# Patient Record
Sex: MALE | Race: WHITE | NOT HISPANIC OR LATINO | Employment: STUDENT | URBAN - METROPOLITAN AREA
[De-identification: names, ages, dates, MRNs, and addresses within clinical notes are randomized per-mention and may not be internally consistent; named-entity substitution may affect disease eponyms.]

---

## 2019-06-25 ENCOUNTER — ALLIED HEALTH/NURSE VISIT (OUTPATIENT)
Dept: TRANSPLANT | Facility: CLINIC | Age: 29
End: 2019-06-25
Attending: INTERNAL MEDICINE
Payer: COMMERCIAL

## 2019-06-25 ENCOUNTER — RESULTS ONLY (OUTPATIENT)
Dept: LAB | Age: 29
End: 2019-06-25

## 2019-06-25 ENCOUNTER — RESULTS ONLY (OUTPATIENT)
Dept: ONCOLOGY | Facility: CLINIC | Age: 29
End: 2019-06-25

## 2019-06-25 ENCOUNTER — RESULTS ONLY (OUTPATIENT)
Dept: OTHER | Facility: CLINIC | Age: 29
End: 2019-06-25

## 2019-06-25 ENCOUNTER — APPOINTMENT (OUTPATIENT)
Dept: LAB | Facility: CLINIC | Age: 29
End: 2019-06-25
Attending: INTERNAL MEDICINE
Payer: COMMERCIAL

## 2019-06-25 DIAGNOSIS — Z00.6 RESEARCH STUDY PATIENT: Primary | ICD-10-CM

## 2019-06-25 LAB — CMV IGG SERPL QL IA: >8 AI (ref 0–0.8)

## 2019-06-25 PROCEDURE — 36415 COLL VENOUS BLD VENIPUNCTURE: CPT

## 2019-06-25 NOTE — PROGRESS NOTES
[id number]: Informed Consent Note     The consent form, including purpose, risks and benefits, was reviewed with Damian Nava, and all questions were answered before he signed the consent form. The patient understands that the study involves a pre-screening phase, a screening phase, and a donation phase. They understand that re-treatment may be possible.    Present during the discussion were Damian and myself. A copy of the signed form was provided to the patient. No procedures specific to this study were performed prior to the patient signing the consent form.    Consent Version Date: 11/6/18  Consent obtained by: Mirian Mckinney    Date: June 25, 2019  HIPAA authorization signed?: yes  HIPAA authorization version date: 8/25/17    MIRIAN MCKINNEY    Form 503.03.01 (Version 2)     Effective date: 01AUG2018     Next Review Date: 01AUG2020

## 2019-06-25 NOTE — NURSING NOTE
Chief Complaint   Patient presents with     Blood Draw     Labs drawn via VPT by MA. Research kit done.     Krista Slater MA

## 2019-06-28 LAB
A* LOCUS: NORMAL
A*: NORMAL
ABTEST METHOD: NORMAL
B* LOCUS: NORMAL
B*: NORMAL
BW-1: NORMAL
BW-2: NORMAL
C* LOCUS: NORMAL
C*: NORMAL

## 2022-03-28 ENCOUNTER — HOSPITAL ENCOUNTER (OUTPATIENT)
Dept: BEHAVIORAL HEALTH | Facility: CLINIC | Age: 32
Discharge: HOME | End: 2022-03-28
Attending: FAMILY MEDICINE | Admitting: FAMILY MEDICINE
Payer: COMMERCIAL

## 2022-03-28 VITALS — BODY MASS INDEX: 30.48 KG/M2 | HEIGHT: 72 IN | WEIGHT: 225 LBS

## 2022-03-28 PROCEDURE — H0001 ALCOHOL AND/OR DRUG ASSESS: HCPCS | Mod: GT,95

## 2022-03-28 RX ORDER — DIPHENOXYLATE HYDROCHLORIDE AND ATROPINE SULFATE 2.5; .025 MG/1; MG/1
1 TABLET ORAL DAILY
COMMUNITY

## 2022-03-28 RX ORDER — CHLORAL HYDRATE 500 MG
CAPSULE ORAL
COMMUNITY
Start: 2022-01-01

## 2022-03-28 ASSESSMENT — COLUMBIA-SUICIDE SEVERITY RATING SCALE - C-SSRS
TOTAL  NUMBER OF ABORTED OR SELF INTERRUPTED ATTEMPTS LIFETIME: NO
ATTEMPT LIFETIME: NO
1. HAVE YOU WISHED YOU WERE DEAD OR WISHED YOU COULD GO TO SLEEP AND NOT WAKE UP?: NO
6. HAVE YOU EVER DONE ANYTHING, STARTED TO DO ANYTHING, OR PREPARED TO DO ANYTHING TO END YOUR LIFE?: NO
TOTAL  NUMBER OF INTERRUPTED ATTEMPTS LIFETIME: NO
2. HAVE YOU ACTUALLY HAD ANY THOUGHTS OF KILLING YOURSELF?: NO

## 2022-03-28 ASSESSMENT — PAIN SCALES - GENERAL: PAINLEVEL: NO PAIN (0)

## 2022-03-28 NOTE — PROGRESS NOTES
"[facility]  [address] [place] [id number]  3/28/2022    Damian Nava  [address] [place] [address]      [place],    This is to verify that Damian Nava (1990) participated in a substance use disorder assessment via telephone call with JUDY Fajardo/KENDAL at [facility] in [place], [place] on 3/28/2022.    Recommendations:  1.)  It was recommended that the patient refrain from drinking and driving at all times.  2.)  Follow all the terms and conditions of his pending court probation, included participating in alcohol and drug screening with court probation as directed.  3.)  Attend and complete a minimum of a Level I Driving with Care 12-hour DWI class.    4.)  In addition, if the patient chooses to continue to drink alcohol he should only drink alcohol in a minimal and social manner by not exceeding 3 \"standard\" alcoholic beverages (12 ounces of regular beer in the 5% alcohol range, 5 ounces of wine in the 12% alcohol range, or 1.5 ounces of distilled spirits in the 40% alcohol range) in any 24-hour period and by spreading his use of alcohol out over a long enough period of time given his gender and his body weight to avoid exceeding a 0.04 blood alcohol concentration.     Below is a list with programs which offer both the Level I and the Level II Driving with Care DWI classes.      The Level I Driving with Care class is a 12-hour DWI class.    The Level II Driving with Care class is a 24-hour DWI class.      You have been recommended to attend the Level I Driving with Care 12-hour DWI class.    Program name Telephone number Website   [employer]    [phone] [email or web address]     [facility] [phone] [email or web address]     [email or web address]   [phone] www.[email or web address]/   [employer] [facility]. [phone] [email or web address]        [facility] " Connection [phone] [email or web address]      [facility] [phone] [email or web address]          There are additional programs which offer these Level I and Level II Driving with Care classes, so if none of the above programs work for you to attend a class, you can use an Internet search to find additional programs which offer the Driving with Care classes.      It is your responsibility to set up a time to attend and complete the recommended level of the Driving with Care class and it is your responsibility to have the program you attend send a letter of completion of the Driving with Care class to your court , your court probation department and/or to your  as needed.     Rational for recommended level of care: The patient would benefit from increasing his awareness regarding the risks of substance abuse and from working on skills to minimize the potential for having future negative substance use consequences by attending a Level I Driving with Care 12-hour DWI class.    The patient reported he is willing to follow the above recommendations.    If you have any additional questions or concerns, please feel free to contact me by any of the contact methods listed below.    Sincerely,    JUDY Jacob, [facility]  CD Evaluation Counselor  [facility]  Rec[facility] Services  [address] [place] [id number]  [email or web address]  Tel: [phone]  Fax: [phone]

## 2022-03-28 NOTE — PROGRESS NOTES
[facility]  Provider Name:  JUDY Quijano/[facility]     Telephone: [phone]      PATIENT'S NAME: Damian Nava  PREFERRED NAME: Estiven  PRONOUNS: he/him/his    MRN: [id number]  : 1990   ACCT. NUMBER:  [id number]  DATE OF SERVICE: 2022  START TIME: 7:59 am  END TIME: 8:50 am  E-MAIL: [email or web address]   PREFERRED PHONE: [phone]   May we leave a program related message: Yes  ADDRESS:   [id number] [place] [id number]  SERVICE MODALITY:  Video Visit:        Provider verified identity through the following two step process.  Patient provided:  Patient  (1990) and Patient's last 4 digits of N ([phone])    Telemedicine Visit: The patient's condition can be safely assessed and treated via synchronous audio and visual telemedicine encounter.      Reason for Telemedicine Visit: Services only offered telehealth    Originating Site (Patient Location): Patient's home    Distant Site (Provider Location): Provider Remote Setting    Consent:  The patient/guardian has verbally consented to: the potential risks and benefits of telemedicine (video visit) versus in person care; bill my insurance or make self-payment for services provided; and responsibility for payment of non-covered services.     Patient would like the video invitation sent by:  My Chart    Mode of Communication:  Video Conference via [employer]well    EMERGENCY CONTACT:   Taty Hadleydaniele (wife)  Tel #: [phone]    Santiago Elijah (brother)  Tel #: [phone]    UNIVERSAL ADULT SUBSTANCE USE DISORDER DIAGNOSTIC ASSESSMENT    Identifying Information:  The patient is a 31 year old, /White male of Scandinavian and Maori decent.  The pronoun use throughout this assessment reflects the patient's chosen pronoun.  The patient was referred for an assessment by the Court system in [place] where he received a OWI.  The patient attended the session alone.     Chief  Complaint:   The patient had an assessment via a remote video visit at [facility] with this counselor for evaluation of a possible substance use disorder.  The reason for the substance use disorder assessment was due to the patient's  recommending he complete a substance use disorder assessment.  The precipitating event was the patient was arrested for an OWI charge in [place] on 3/19/2022.  The patient reported he had consumed 4 beers between 5:00 pm and 8:30 pm while at a casino in [place] and he was pulled over as he was driving 15-minutes away to the hotel where he and his wife were staying for the night.  The patient reported being pulled over because the headlights on his vehicle were not on as he was driving away from the casino.  The patient reported he was driving his wife's vehicle and he had expected the headlights to come on automatically as they do on his vehicle, but he had failed to confirm the lights were on prior to driving on that night.  The patient reported having 1 prior legal charge for a minor consumption at age 17 and he denied having any other history of legal charges or arrests.  The patient reported he is not yet on court probation for his OWI charge in [place] and his next court date is on 4/11/2022.  The patient appeared to be willing follow the recommendations to refrain from drinking and driving at all times and to attend and complete a minimum of a Level I Driving with Care 12-hour DWI class.  The patient denied ever having any concerns about having substance abuse issues.  The patient denied having any inpatient detoxification admissions or inpatient hospitalizations for withdrawal symptoms.  The patient denied having any history of participating in a substance use disorder treatment program.  The patient denied having any history of attending 12-step or other support group meetings.  The patient reported he had never felt any need to make significant changes to his use of  alcohol.  The patient does not appear to be in severe withdrawal, an imminent safety risk to self or others, or requiring immediate medical attention and may proceed with the assessment interview.    Social/Family History:  The patient reported growing up in [place], [place].  The patient reported being raised by both of his parents in the same family home.  The patient reported discipline in his family home was in the form of verbal reprimands or being spanked.  The patient denied experiencing or witnessing any verbal, physical or sexual abuse when he was growing up in the family home.  The patient reported feeling supported by all of his family members when he was growing up.  The patient reported being raised in the Religion Che[employer].  The patient reported a history of:   Family History   Problem Relation Age of Onset     Substance Abuse Father      Substance Abuse Brother    The patient reported overall his childhood was happy.  The patient described his current relationships with his family of origin as being good.      The patient describes his cultural background as being a /White male of Scandinavian and Beninese decent.  Cultural influences and impact on patient's life structure, values, norms, and healthcare: The patient denied cultural concerns had an impact on life structure, values, norms, or healthcare.  Contextual influences on patient's health include: Family Factors: The patient reported his father and 1 of his 2 brothers had a history of alcohol abuse, but both of them had been in recovery for several years.  The patient identified his preferred language to be English.  The patient reported he does not need the assistance of an  or other support involved in therapy.  The patient reported he is actively involved in community of maricel activities.  The patient felt his spirituality would likely play some role in his recovery.    The patient reported having no significant delays in  developmental tasks.  The patient's highest education level was graduate school.  The patient identified the following learning problems: none reported.  The patient reports he is able to understand written materials.    The patient reported being  1 time and he is still  to his wife.  The patient identified as being heterosexual and he reported being  to his wife for the past 7 years.  The patient's wife drinks alcohol in a very minimal and social manner.  The patient reported having 3 sons, his oldest is 5 years old and then he has 2 twin boys who are 9-months old who live with him and his wife in the family home.     The patient reported living his wife and their 3 sons in a single family home.  The patient denied having any concerns regarding his immediate living environment and/or neighborhood and he plans to continue to live there.  The patient identified his wife, his father, his mother, his in-laws and his siblings as being his primary support network at this time.  The patient identified the quality of his relationships with his support network as being good.  The patient would like the following people involved in treatment services if recommended: None at this time.     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  The patient reported engaging in the following recreation/leisure activities while using alcohol or other non-prescribed mood altering chemicals: Getting together with his wife's cousins at social events, would have 1-2 beers.  The patient reported the following people are supportive of his recovery: his wife, his father, his mother, his in-laws and his siblings.    The patient reported he had been working full-time as a  for the past 11+ years.  The patient reported his income is obtained through employment.  The patient reported having  financial stressors at this time, including money being more tight because his wife is not working at this time.      The patient reported the following substance related arrests or legal issues: The patient was arrested for an OWI charge in [place] on 3/19/2022.  The patient reported he had consumed 4 beers between 5:00 pm and 8:30 pm while at a casino in [place] and he was pulled over as he was driving 15-minutes away to the hotel where he and his wife were staying for the night.  The patient reported being pulled over because the headlights on his vehicle were not on as he was driving away from the casino.  The patient reported he was driving his wife's vehicle and he had expected the headlights to come on automatically as they do on his vehicle, but he had failed to confirm the lights were on prior to driving on that night.  The patient reported having 1 prior legal charge for a minor consumption at age 17 and he denied having any other history of legal charges or arrests.  The patient reported he is not yet on court probation for his OWI charge in [place] and his next court date is on 4/11/2022.       Patient's Strengths and Limitations:  The patient identified the following strengths or resources that will help him succeed in treatment: [employer] / [employer], commitment to health and well being, exercise routine, maricel / spirituality, family support, positive work environment, motivation and work ethic.  Things that may interfere with the patient's success in treatment include: none identified.     Assessments:  The following assessments were completed by patient for this visit:  PHQ2:   PHQ-2 ( 1999 [employer]) 3/28/2022   Q1: Little interest or pleasure in doing things 0   Q2: Feeling down, depressed or hopeless 1   PHQ-2 Score 1     GAD2:   RONNIE-2 3/25/2022   Feeling nervous, anxious, or on edge 1   Not being able to stop or control worrying 0   RONNIE-2 Total Score 1     [place] Suicide Severity Rating Scale  (Lifetime/Recent)  [place] Suicide Severity Rating (Lifetime/Recent) 3/28/2022   1. Wish to be Dead (Lifetime) 0   2. Non-Specific Active Suicidal Thoughts (Lifetime) 0   Actual Attempt (Lifetime) 0   Has subject engaged in non-suicidal self-injurious behavior? (Lifetime) 0   Interrupted Attempts (Lifetime) 0   Aborted or Self-Interrupted Attempt (Lifetime) 0   Preparatory Acts or Behavior (Lifetime) 0   Calculated C-SSRS Risk Score (Lifetime/Recent) No Risk Indicated     Personal and Family Medical History:  The patient did not report a family history of mental health concerns.       The patient reported the following previous mental health diagnoses: The patient denied having any history of being diagnosed with a clinical mental health disorder.   The patient reported his primary mental health symptoms include: depression and these do not impact his ability to function.  The patient has not received mental health services in the past: The patient denied having any history of being prescribed psychotropic medications.  The patient denied having any history of working with a 1:1 mental health therapist.  Psychiatric Hospitalizations: None.  The patient denies a history of civil commitment.  Current mental health services/providers include:  The patient denied having any history of being prescribed psychotropic medications.  The patient denied having any history of working with a 1:1 mental health therapist.    GAIN-SS Tool:      When was the last time that you had significant problems...  a. with feeling very trapped, lonely, sad, blue, depressed or hopeless about the future? Past Month  b. with sleep trouble, such as bad dreams, sleeping restlessly, or falling asleep during the day? Never  c. with feeling very anxious, nervous, tense, scared, panicked or like something bad was going to happen?  2 - 12 months ago  d. with becoming very distressed and upset when something reminded you of the past?  Never  e. with  thinking about ending your life or committing suicide?  Never  When was the last time that you did the following things two or more times?  a. Lied or conned to get things you wanted or to avoid having to do something?   Never  b. Had a hard time paying attention at school, work or home? Never  c. Had a hard time listening to instructions at school, work or home?  Never  d. Were a bully or threatened other people?  Never  e. Started physical fights with other people?  Never    The patient has had a physical exam to rule out medical causes for current symptoms.  Date of last physical exam was within the past year. The patient was encouraged to follow up with PCP if symptoms were to develop.  The patient does not have a Primary Care Provider and was encouraged to establish care with a PCP.  The patient reported having no medical concerns at this time.  The patient denied having any current issues with pain.  The patient is male and is not pregnant.  There are not significant appetite / nutritional concerns / weight changes.  The patient does not report having a history of an eating disorder.  The patient does not report a history of head injury / trauma / cognitive impairment.      The patient reported current medications as:   Outpatient Medications Marked as Taking for the 3/28/22 encounter (Hospital Encounter) with Jayy Saucedo LADC   Medication Sig     fish oil-omega-3 fatty acids 1000 MG capsule      Medication Adherence:  The patient reported taking OTC medications on a daily basis.    Patient Allergies:    None    Medical History:    The patient reported having no medical concerns at this time.    Substance Use:  The patient reported the following biological family members or relatives with chemical health issues: The patient reported his father and 1 of his 2 brothers had a history of alcohol abuse, but both of them had been in recovery for several years.  The patient denied having any inpatient  detoxification admissions or inpatient hospitalizations for withdrawal symptoms.  The patient denied having any history of participating in a substance use disorder treatment program.  The patient denied having any history of attending 12-step or other support group meetings.  The patient denied having any history of participating in gambling treatment.  The patient is not currently receiving any chemical dependency treatment.               X = Primary Drug Used   Age of First Use Most Recent Pattern of Use and Duration   Need enough information to show pattern (both frequency and amounts) and to show tolerance for each chemical that has a diagnosis   Date of last use and time, if needed   Withdrawal Potential? Requiring special care Method of use  (oral, smoked, snort, IV, etc)     X Alcohol     16 The patient reported his heaviest use of alcohol had been between the ages of 16 to 20, when he reported a pattern of drinking 6 beers 2 times per week.    During the past year, he reported a pattern of drinking 1-2 beers 3-4 times per month.    Within the past year, he reported exceeding the above amounts and drinking 4 beers on 2-3 occasions.   3/19/2022    (4 beers) None Oral      Marijuana/  Hashish   No use          Cocaine/Crack     No use          Meth/  Amphetamines   No use          Heroin     No use          Other Opiates/  Synthetics   No use          Inhalants     No use          Benzodiazepines     No use          Hallucinogens     No use          Barbiturates/  Sedatives/  Hypnotics No use          Over-the-Counter Drugs   No use          Other     No use          Nicotine     No use         The patient reported the following problems as a result of their substance use: DUI and legal issues.  The patient is not concerned about substance use.     The patient reports experiencing the following withdrawal symptoms within the past 12 months: none and the following within the past 30 days: none. (DSM-11)  The  patient denied having significant urges to use alcohol.  (DSM-4)  The patient reported he has not used more alcohol than intended and over a longer period of time than intended.  (DSM-1)  The patient reported he has not had unsuccessful attempts to cut down or control use of alcohol.  (DSM-2)  The patient reported his longest period of abstinence from alcohol had been since 3/19/2022.  The patient reported he has not needed to use more alcohol to achieve the same effect.  (DSM-10)  The patient does not report diminished effect with use of same amount of alcohol.  (DSM-10)     The patient does not report a great deal of time is spent in activities necessary to obtain, use, or recover from alcohol effects.  (DSM-3)  The patient does not report important social, occupational, or recreational activities are given up or reduced because of alcohol use.  (DSM-7)  The patient denied having a persistent or recurrent physical or psychological problem that is likely to have been caused or exacerbated by use.  (DSM-9)  The patient reported the following problem behaviors while under the influence of substances: None.  (DSM-6)  The patient reported recurrent use of alcohol in physically hazardous such as driving a motor vehicle while under the influence.  (DSM-8)  The patient reported his recovery goal is: The patient's plan and goal is to abstain from alcohol and from all other non-prescribed mood altering chemicals.     The patient does not have other addictive behaviors he is concerned about at this time.  The patient reported his substance use has not negatively impacted his ability to function in a school setting.  (DSM-5)  The patient reported his substance use has not negatively impacted his ability to function in a work setting.  (DSM-5)  The patient's demographics and history impact his recovery in the following ways: The patient reported his father and 1 of his 2 brothers had a history of alcohol abuse, but both of  them had been in recovery for several years.      Dimension Scale Ratings:    Dimension 1 -  Acute Intoxication/Withdrawal: 0 - No Problem    Dimension 2 - Biomedical: 0 - No Problem    Dimension 3 - Emotional/Behavioral/Cognitive Conditions: 0 - No Problem    Dimension 4 - Readiness to Change:  1 - Minor Problem    Dimension 5 - Relapse/Continued Use/ Continued Problem Potential: 1 - Minor Problem    Dimension 6 - Recovery Environment:  1 - Minor Problem    Significant Losses / Trauma / Abuse / Neglect Issues:   The patient did not serve in the .  There are indications or report of significant loss, trauma, abuse or neglect issues related to: The patient denied having any history of being verbally, emotionally, physically or sexually abused.  The patient denied having any history of trauma issues.  The patient denied having any history of suicide attempts and denied having any current suicide ideation.  The patient denied having any history of self-injurious behavior.    Concerns for possible neglect are not present.    Safety Assessment:   Patient denies current homicidal ideation and behaviors.  Patient denies current self-injurious ideation and behaviors.    Patient reported unsafe motor vehicle operation associated with substance use.  Patient denied any high risk behaviors associated with mental health symptoms.  Patient reports the following current concerns for their personal safety: None.  Patient reports there are not firearms in the house.     History of Safety Concerns:  Patient denied a history of homicidal ideation.     Patient denied a history of personal safety concerns.    Patient denied a history of assaultive behaviors.    Patient denied a history of sexual assault behaviors.     Patient reported a history of unsafe motor vehicle operation associated with substance use.  Patient denies any history of high risk behaviors associated with mental health symptoms.  Patient reports the following  protective factors: spirituality, positive relationships positive family connections, forward/future oriented thinking, dedication to family/friends, safe and stable environment, effectively controls impulses, regular physical activity, living with other people, daily obligations, structured day, commitment to well-being, healthy fear of risky behaviors or pain, financial stability, strong sense of self-worth/esteem and sense of personal control or determination.    Risk Plan:  See Recommendations for Safety and Risk Management Plan    Review of Symptoms per patient report:  Substance Use:  driving under the influence.     Collateral Contact Summary:   Collateral contacts contributing to this assessment:  The patient's electronic medical records were reviewed at time of assessment.    This counselor talked with this patient's brother, Santiago Nava, on 3/28/2022 at 8:55 am.  Santiago stated he had never had any concern about the patient having a problem with alcohol or having a problem with any other mood altering chemical.  Santiago stated he felt the patient's OWI charge on 3/19/2022 was due to the patient having bad judgment on that night associated with wanting to make sure the patient's wife would not be at risk of driving under the influence, despite her drinking less alcohol than the patient on that night.  Santiago stated the patient choosing to drive his vehicle after consuming 4 beers was an exception to patient's normal behavior and Santiago stated he did not feel the patient had a pattern of driving while under the influence of alcohol.  Santiago stated the patient rarely drinks alcohol in social situations when they are together, but if the patient would drink alcohol it would typically be limited to 1-2 beers.      This counselor talked with this patient's spouse, Taty Hadleyodilonolvin, on 3/28/2022 at 9:15 am.  Taty stated she had known the patient for the past 8 years and she stated during the entire time she had known  the patient she had never had any concerns about the patient having a substance abuse problem with alcohol.  Taty stated the patient typically drinks between 1-2 beers a few times per month on average and he rarely exceeds drinking more than 2 beers.  Taty stated the patient had consumed more alcohol than normal on the night of the OWI charge on 3/19/2022 when he had consumed 4 beers, but she stated the only reason the patient was pulled over on that night was because he was driving her car and the headlights on the car had not gone on automatically, as he was used to with his car.  Taty felt the patient's OWI charge on 3/19/2022 was due to the patient using bad judgment to drive after drinking more alcohol than he would typically consume on that particular night as opposed to him having a pattern of driving after drinking alcohol.    If court related records were reviewed, summarize here: None    Information from collateral contacts supported/largely agreed with information from the client and associated risk ratings.    Information in this assessment was obtained from the medical record and provided by the patient who is a good historian.        The patient will have open access to his substance use disorder assessment medical record.    Diagnostic Criteria:   8.)  Recurrent use of the substance in which it is physically hazardous.  Met for:  alcohol.    As evidenced by self report and criteria, the patient meets the following DSM-5 Diagnoses: (Sustained by DSM-5 Criteria Listed Above)      The patient does not currently identify with a DSM-5 substance use disorder.    Specify if: In early remission:  After full criteria for alcohol/drug use disorder were previously met, none of the criteria for alcohol/drug use disorder have been met for at least 3 months but for less than 12 months (with the exception that Criterion A4,  Craving or a strong desire or urge to use alcohol/drug  may be met).     In sustained  "remission:   After full criteria for alcohol use disorder were previously met, none of the criteria for alcohol/drug use disorder have been met at any time during a period of 12 months or longer (with the exception that Criterion A4,  Craving or strong desire or urge to use alcohol/drug  may be met).     Specify if:   This additional specifier is used if the individual is in an environment where access to alcohol is restricted.    Mild: Presence of 2-3 symptoms  Moderate: Presence of 4-5 symptoms  Severe: Presence of 6 or more symptoms    Recommendations:     1. Plan for Safety and Risk Management:     It was recommended the patient call 911 or go to the local Emergency Department should there be any significant change in the above risk factors.            Report to child / adult protection services was NA.    2. [facility]ITH Referrals:      Recommendations:      1.)  It was recommended that the patient refrain from drinking and driving at all times.  2.)  Follow all the terms and conditions of his pending court probation, included participating in alcohol and drug screening with court probation as directed.  3.)  Attend and complete a minimum of a Level I Driving with Care 12-hour DWI class.    4.)  In addition, if the patient chooses to continue to drink alcohol he should only drink alcohol in a minimal and social manner by not exceeding 3 \"standard\" alcoholic beverages (12 ounces of regular beer in the 5% alcohol range, 5 ounces of wine in the 12% alcohol range, or 1.5 ounces of distilled spirits in the 40% alcohol range) in any 24-hour period and by spreading his use of alcohol out over a long enough period of time given his gender and his body weight to avoid exceeding a 0.04 blood alcohol concentration.     Below is a list with programs which offer both the Level I and the Level II Driving with Care DWI classes.      The Level I Driving with Care class is a 12-hour DWI class.    The Level II Driving with Care class is " a 24-hour DWI class.      You have been recommended to attend the Level I Driving with Care 12-hour DWI class.    Program name Telephone number Website   [employer]    [phone] https://www.[employer]/     [facility] [phone] www.accessbehavioralchange.[employer]/     [employer]   [phone] www.[employer].org/   [employer] [phone] http://www.drivingwithcare.org/        [facility] [facility] [phone] https://[facility].org/resources/driving-with-care/      [facility] [phone] https://www.[employer]/driving-with-care-class/          There are additional programs which offer these Level I and Level II Driving with Care classes, so if none of the above programs work for you to attend a class, you can use an Internet search to find additional programs which offer the Driving with Care classes.      It is your responsibility to set up a time to attend and complete the recommended level of the Driving with Care class and it is your responsibility to have the program you attend send a letter of completion of the Driving with Care class to your court , your court probation department and/or to your  as needed.     Rational for recommended level of care: The patient would benefit from increasing his awareness regarding the risks of substance abuse and from working on skills to minimize the potential for having future negative substance use consequences by attending a Level I Driving with Care 12-hour DWI class.    The patient reported he is willing to follow the above recommendations.    The patient would like the following family or other support people involved in their treatment:  None at this time.    The patient does not have a history of opiate use.    3.  Mental Health Referrals:     The patient did not appear to be in any need of a mental health referral at this time.    4.  Cultural Concerns:    The patient did not identify having any cultural concerns regarding mental health, physical health, or substance use issues.     5. Recommendations for treatment focus:      Alcohol / Substance Use - See #2. [facility]ITH Referrals above for details on recommendations.    Provider Name/ Credentials:  EKNDAL Quijano  March 28, 2022

## 2022-05-07 ENCOUNTER — HEALTH MAINTENANCE LETTER (OUTPATIENT)
Age: 32
End: 2022-05-07

## 2023-01-07 ENCOUNTER — HEALTH MAINTENANCE LETTER (OUTPATIENT)
Age: 33
End: 2023-01-07

## 2023-06-02 ENCOUNTER — HEALTH MAINTENANCE LETTER (OUTPATIENT)
Age: 33
End: 2023-06-02

## 2024-06-29 ENCOUNTER — HEALTH MAINTENANCE LETTER (OUTPATIENT)
Age: 34
End: 2024-06-29

## 2025-03-12 PROBLEM — N42.81 PROSTATE PAIN: Status: ACTIVE | Noted: 2025-03-12

## 2025-03-12 PROBLEM — R39.9 URINARY SYMPTOM OR SIGN: Status: ACTIVE | Noted: 2025-03-12

## 2025-03-13 ENCOUNTER — OFFICE VISIT (OUTPATIENT)
Dept: FAMILY MEDICINE | Facility: CLINIC | Age: 35
End: 2025-03-13
Payer: COMMERCIAL

## 2025-03-13 VITALS
RESPIRATION RATE: 16 BRPM | SYSTOLIC BLOOD PRESSURE: 149 MMHG | BODY MASS INDEX: 33.32 KG/M2 | HEIGHT: 72 IN | OXYGEN SATURATION: 98 % | WEIGHT: 246 LBS | DIASTOLIC BLOOD PRESSURE: 90 MMHG | TEMPERATURE: 98.2 F | HEART RATE: 65 BPM

## 2025-03-13 DIAGNOSIS — R10.9 ABDOMINAL PRESSURE: Primary | ICD-10-CM

## 2025-03-13 LAB
ALBUMIN UR-MCNC: 30 MG/DL
APPEARANCE UR: CLEAR
BACTERIA #/AREA URNS HPF: ABNORMAL /HPF
BILIRUB UR QL STRIP: NEGATIVE
COLOR UR AUTO: YELLOW
GLUCOSE UR STRIP-MCNC: NEGATIVE MG/DL
HGB UR QL STRIP: NEGATIVE
KETONES UR STRIP-MCNC: NEGATIVE MG/DL
LEUKOCYTE ESTERASE UR QL STRIP: NEGATIVE
NITRATE UR QL: NEGATIVE
PH UR STRIP: 6.5 [PH] (ref 5–8)
PSA SERPL DL<=0.01 NG/ML-MCNC: 0.67 NG/ML
RBC #/AREA URNS AUTO: ABNORMAL /HPF
SP GR UR STRIP: 1.02 (ref 1–1.03)
SQUAMOUS #/AREA URNS AUTO: ABNORMAL /LPF
UROBILINOGEN UR STRIP-ACNC: 0.2 E.U./DL
WBC #/AREA URNS AUTO: ABNORMAL /HPF

## 2025-03-13 NOTE — PROGRESS NOTES
Assessment & Plan   Problem List Items Addressed This Visit       Abdominal pressure - Primary     Presents today for evaluation of a mild pressure in his lower abdomen over the past 10 days.  He states that it is a very dull ache and he does not notice it when he is up and moving about.  He states that he recently got a new job and is more sedentary at work.  His discomfort is improving over the past couple of days.  He is drink some cranberry juice because he thought he may have a urinary tract infection.  No hematuria.  Normal bowel movements.  No new sexual partners.  Denies dysuria.  He describes it as a very dull pressure in the lower abdomen.  Denies back pain.  No fevers.  No flank pain.    We discussed that his symptoms may be attributed to the fact that he is more sedentary and may be experiencing pain radiating from his back into his lower abdomen, prostatitis, ureteritis, constipation.  Further evaluation will proceed with urinalysis with reflex to culture, CBC and PSA.  Declines STD testing as he has had no new sexual partners.    Urinalysis returns and is normal with no evidence of infection.  Will await remaining testing at this time.  We discussed that if he has worsening discomfort over the weekend or develops a fever he should go to the emergency room.  We may need to consider imaging if symptoms persist.         Relevant Orders    UA with Microscopic reflex to Culture - Clinic Collect (Completed)    PSA, screen    CBC with platelets and differential    UA Microscopic with Reflex to Culture             BMI  Estimated body mass index is 33.36 kg/m  as calculated from the following:    Height as of this encounter: 1.829 m (6').    Weight as of this encounter: 111.6 kg (246 lb).             Scooby Jean-Baptiste is a 34 year old, presenting for the following health issues:  feels like his bladder is not emptying    History of Present Illness       Reason for visit:  Mild pain in bladder area  Symptom  onset:  3-7 days ago  Symptoms include:  Feeling like i need to pee. Pressure  Symptom intensity:  Mild  Symptom progression:  Improving  Had these symptoms before:  No  What makes it worse:  Sitting  What makes it better:  Standing   He is taking medications regularly.              Objective    BP (!) 149/90 (BP Location: Left arm, Patient Position: Left side, Cuff Size: Adult Large)   Pulse 65   Temp 98.2  F (36.8  C) (Oral)   Resp 16   Ht 1.829 m (6')   Wt 111.6 kg (246 lb)   SpO2 98%   BMI 33.36 kg/m    Body mass index is 33.36 kg/m .  Physical Exam  Vitals and nursing note reviewed.   Constitutional:       Appearance: Normal appearance.   Abdominal:      General: Abdomen is flat.      Palpations: Abdomen is soft.      Tenderness: There is no abdominal tenderness. There is no right CVA tenderness, left CVA tenderness, guarding or rebound.   Neurological:      Mental Status: He is alert.                Signed Electronically by: Crista Stokes PA-C

## 2025-03-13 NOTE — ASSESSMENT & PLAN NOTE
Presents today for evaluation of a mild pressure in his lower abdomen over the past 10 days.  He states that it is a very dull ache and he does not notice it when he is up and moving about.  He states that he recently got a new job and is more sedentary at work.  His discomfort is improving over the past couple of days.  He is drink some cranberry juice because he thought he may have a urinary tract infection.  No hematuria.  Normal bowel movements.  No new sexual partners.  Denies dysuria.  He describes it as a very dull pressure in the lower abdomen.  Denies back pain.  No fevers.  No flank pain.    We discussed that his symptoms may be attributed to the fact that he is more sedentary and may be experiencing pain radiating from his back into his lower abdomen, prostatitis, ureteritis, constipation.  Further evaluation will proceed with urinalysis with reflex to culture, CBC and PSA.  Declines STD testing as he has had no new sexual partners.    Urinalysis returns and is normal with no evidence of infection.  Will await remaining testing at this time.  We discussed that if he has worsening discomfort over the weekend or develops a fever he should go to the emergency room.  We may need to consider imaging if symptoms persist.

## 2025-03-14 ENCOUNTER — HOSPITAL ENCOUNTER (EMERGENCY)
Facility: HOSPITAL | Age: 35
Discharge: HOME | End: 2025-03-14
Attending: EMERGENCY MEDICINE | Admitting: EMERGENCY MEDICINE
Payer: COMMERCIAL

## 2025-03-14 ENCOUNTER — APPOINTMENT (OUTPATIENT)
Dept: ULTRASOUND IMAGING | Facility: HOSPITAL | Age: 35
End: 2025-03-14
Attending: EMERGENCY MEDICINE
Payer: COMMERCIAL

## 2025-03-14 ENCOUNTER — APPOINTMENT (OUTPATIENT)
Dept: CT IMAGING | Facility: HOSPITAL | Age: 35
End: 2025-03-14
Attending: EMERGENCY MEDICINE
Payer: COMMERCIAL

## 2025-03-14 VITALS
HEART RATE: 75 BPM | WEIGHT: 240 LBS | OXYGEN SATURATION: 98 % | SYSTOLIC BLOOD PRESSURE: 149 MMHG | TEMPERATURE: 98.1 F | DIASTOLIC BLOOD PRESSURE: 95 MMHG | HEIGHT: 72 IN | RESPIRATION RATE: 16 BRPM | BODY MASS INDEX: 32.51 KG/M2

## 2025-03-14 DIAGNOSIS — R10.31 RIGHT GROIN PAIN: ICD-10-CM

## 2025-03-14 DIAGNOSIS — N50.811 TESTICULAR PAIN, RIGHT: ICD-10-CM

## 2025-03-14 LAB
ALBUMIN SERPL BCG-MCNC: 4.5 G/DL (ref 3.5–5.2)
ALBUMIN UR-MCNC: NEGATIVE MG/DL
ALP SERPL-CCNC: 78 U/L (ref 40–150)
ALT SERPL W P-5'-P-CCNC: 33 U/L (ref 0–70)
ANION GAP SERPL CALCULATED.3IONS-SCNC: 11 MMOL/L (ref 7–15)
APPEARANCE UR: CLEAR
AST SERPL W P-5'-P-CCNC: 27 U/L (ref 0–45)
BACTERIA #/AREA URNS HPF: ABNORMAL /HPF
BASOPHILS # BLD AUTO: 0 10E3/UL (ref 0–0.2)
BASOPHILS NFR BLD AUTO: 1 %
BILIRUB SERPL-MCNC: 0.5 MG/DL
BILIRUB UR QL STRIP: NEGATIVE
BUN SERPL-MCNC: 26.3 MG/DL (ref 6–20)
C TRACH DNA SPEC QL NAA+PROBE: NEGATIVE
CALCIUM SERPL-MCNC: 9.5 MG/DL (ref 8.8–10.4)
CHLORIDE SERPL-SCNC: 104 MMOL/L (ref 98–107)
COLOR UR AUTO: ABNORMAL
CREAT SERPL-MCNC: 1.11 MG/DL (ref 0.67–1.17)
EGFRCR SERPLBLD CKD-EPI 2021: 88 ML/MIN/1.73M2
EOSINOPHIL # BLD AUTO: 0.2 10E3/UL (ref 0–0.7)
EOSINOPHIL NFR BLD AUTO: 3 %
ERYTHROCYTE [DISTWIDTH] IN BLOOD BY AUTOMATED COUNT: 12 % (ref 10–15)
GLUCOSE SERPL-MCNC: 110 MG/DL (ref 70–99)
GLUCOSE UR STRIP-MCNC: NEGATIVE MG/DL
HCO3 SERPL-SCNC: 23 MMOL/L (ref 22–29)
HCT VFR BLD AUTO: 42.2 % (ref 40–53)
HGB BLD-MCNC: 15 G/DL (ref 13.3–17.7)
HGB UR QL STRIP: NEGATIVE
HOLD SPECIMEN: NORMAL
IMM GRANULOCYTES # BLD: 0 10E3/UL
IMM GRANULOCYTES NFR BLD: 0 %
KETONES UR STRIP-MCNC: ABNORMAL MG/DL
LEUKOCYTE ESTERASE UR QL STRIP: NEGATIVE
LIPASE SERPL-CCNC: 19 U/L (ref 13–60)
LYMPHOCYTES # BLD AUTO: 2.3 10E3/UL (ref 0.8–5.3)
LYMPHOCYTES NFR BLD AUTO: 36 %
MCH RBC QN AUTO: 30.6 PG (ref 26.5–33)
MCHC RBC AUTO-ENTMCNC: 35.5 G/DL (ref 31.5–36.5)
MCV RBC AUTO: 86 FL (ref 78–100)
MONOCYTES # BLD AUTO: 0.6 10E3/UL (ref 0–1.3)
MONOCYTES NFR BLD AUTO: 10 %
N GONORRHOEA DNA SPEC QL NAA+PROBE: NEGATIVE
NEUTROPHILS # BLD AUTO: 3.2 10E3/UL (ref 1.6–8.3)
NEUTROPHILS NFR BLD AUTO: 51 %
NITRATE UR QL: NEGATIVE
NRBC # BLD AUTO: 0 10E3/UL
NRBC BLD AUTO-RTO: 0 /100
PH UR STRIP: 5.5 [PH] (ref 5–7)
PLATELET # BLD AUTO: 248 10E3/UL (ref 150–450)
POTASSIUM SERPL-SCNC: 3.8 MMOL/L (ref 3.4–5.3)
PROT SERPL-MCNC: 7 G/DL (ref 6.4–8.3)
RBC # BLD AUTO: 4.9 10E6/UL (ref 4.4–5.9)
RBC URINE: <1 /HPF
SODIUM SERPL-SCNC: 138 MMOL/L (ref 135–145)
SP GR UR STRIP: 1.02 (ref 1–1.03)
SPECIMEN TYPE: NORMAL
SPECIMEN TYPE: NORMAL
SQUAMOUS EPITHELIAL: <1 /HPF
UROBILINOGEN UR STRIP-MCNC: <2 MG/DL
WBC # BLD AUTO: 6.3 10E3/UL (ref 4–11)
WBC URINE: 1 /HPF

## 2025-03-14 PROCEDURE — 87491 CHLMYD TRACH DNA AMP PROBE: CPT | Performed by: EMERGENCY MEDICINE

## 2025-03-14 PROCEDURE — 250N000011 HC RX IP 250 OP 636: Performed by: EMERGENCY MEDICINE

## 2025-03-14 PROCEDURE — 85025 COMPLETE CBC W/AUTO DIFF WBC: CPT | Performed by: EMERGENCY MEDICINE

## 2025-03-14 PROCEDURE — 36415 COLL VENOUS BLD VENIPUNCTURE: CPT | Performed by: EMERGENCY MEDICINE

## 2025-03-14 PROCEDURE — 83690 ASSAY OF LIPASE: CPT | Performed by: EMERGENCY MEDICINE

## 2025-03-14 PROCEDURE — 93976 VASCULAR STUDY: CPT

## 2025-03-14 PROCEDURE — 99285 EMERGENCY DEPT VISIT HI MDM: CPT | Mod: 25

## 2025-03-14 PROCEDURE — 81001 URINALYSIS AUTO W/SCOPE: CPT | Performed by: EMERGENCY MEDICINE

## 2025-03-14 PROCEDURE — 80053 COMPREHEN METABOLIC PANEL: CPT | Performed by: EMERGENCY MEDICINE

## 2025-03-14 PROCEDURE — 96374 THER/PROPH/DIAG INJ IV PUSH: CPT

## 2025-03-14 PROCEDURE — 87591 N.GONORRHOEAE DNA AMP PROB: CPT | Performed by: EMERGENCY MEDICINE

## 2025-03-14 PROCEDURE — 87086 URINE CULTURE/COLONY COUNT: CPT | Performed by: EMERGENCY MEDICINE

## 2025-03-14 PROCEDURE — 74176 CT ABD & PELVIS W/O CONTRAST: CPT

## 2025-03-14 RX ORDER — KETOROLAC TROMETHAMINE 15 MG/ML
15 INJECTION, SOLUTION INTRAMUSCULAR; INTRAVENOUS ONCE
Status: COMPLETED | OUTPATIENT
Start: 2025-03-14 | End: 2025-03-14

## 2025-03-14 RX ADMIN — KETOROLAC TROMETHAMINE 15 MG: 15 INJECTION, SOLUTION INTRAMUSCULAR; INTRAVENOUS at 01:11

## 2025-03-14 ASSESSMENT — ACTIVITIES OF DAILY LIVING (ADL)
ADLS_ACUITY_SCORE: 41
ADLS_ACUITY_SCORE: 41

## 2025-03-14 ASSESSMENT — COLUMBIA-SUICIDE SEVERITY RATING SCALE - C-SSRS
1. IN THE PAST MONTH, HAVE YOU WISHED YOU WERE DEAD OR WISHED YOU COULD GO TO SLEEP AND NOT WAKE UP?: NO
6. HAVE YOU EVER DONE ANYTHING, STARTED TO DO ANYTHING, OR PREPARED TO DO ANYTHING TO END YOUR LIFE?: NO
2. HAVE YOU ACTUALLY HAD ANY THOUGHTS OF KILLING YOURSELF IN THE PAST MONTH?: NO

## 2025-03-14 NOTE — ED PROVIDER NOTES
NAME: Damian Nava  AGE: 34 year old male  YOB: 1990  MRN: [id number]  EVALUATION DATE & TIME: 3/14/2025 12:30 AM    PCP: No Ref-Primary, Physician    ED PROVIDER: Roby Guerrero M.D.      Chief Complaint   Patient presents with    Abdominal Pain         FINAL IMPRESSION:  1. Right groin pain    2. Testicular pain, right        MEDICAL DECISION MAKIN:43 AM I met with the patient, obtained history, performed an initial exam, and discussed options and plan for diagnostics and treatment here in the ED.   2:26 AM I rechecked the patient. He is feeling better. We discussed the plan for discharge. He was agreeable.  Patient was clinically assessed and consented to treatment. After assessment, medical decision making and workup were discussed with the patient. The patient was agreeable to plan for testing, workup, and treatment.  Pertinent Labs & Imaging studies reviewed. (See chart for details)       Medical Decision Making  Obtained supplemental history:Supplemental history obtained?: Documented in chart  Reviewed external records: External records reviewed?: Documented in chart  Care impacted by chronic illness:Other: No significant illness  Care significantly affected by social determinants of health:Access to Medical Care  Did you consider but not order tests?: In addition to work-up documented, I considered the following work up:   Did you interpret images independently?: Independent interpretation of ECG and images noted in documentation, when applicable.  Consultation discussion with other provider:Did you involve another provider (consultant, MH, pharmacy, etc.)?: No  Discharge. No recommendations on prescription strength medication(s). See documentation for any additional details.  Not Applicable    Damian Nava is a 34 year old male who presents with abdominal pain, testicular pain.   Differential diagnosis includes but not limited to testicular torsion, epididymitis,  urinary tract infection, kidney stone, inguinal hernia.  Patient is a 34 year-old male who has had 4 to 5 days of right lower pelvis to right testicle pain.  Patient reports some increase in pain after intercourse tonight.  Patient does not have any penile discharge or lesions.  There is no rashes and no redness and he does report the pain is Burning in the inguinal area and then down into the testicle.  This could be referred pain and after discussion with patient we will proceed with workup.  He did have a urinalysis at urgent care today but this was negative.  Patient sent for repeat urinalysis was negative along with GC chlamydia but this is pending.  Ultrasound and CT ordered.  CBC unremarkable with no leukocytosis,  metabolic panel otherwise unremarkable.  Patient's ultrasound was negative for torsion and CT negative for kidney stones or other acute findings.  Given all the negative labs and negative CT this is reassuring however does not I  clear show ointment because this pain.  It could just be musculoskeletal or possibly hernia that is not seen on CT refilled on exam.  Patient without any acute emergent process presently and would be plan for discharge home with over-the-counter medication and follow-up with primary doctor.  Patient comfortable plan will be discharged home.    0 minutes of critical care time    MEDICATIONS GIVEN IN THE EMERGENCY:  Medications   ketorolac (TORADOL) injection 15 mg (15 mg Intravenous $Given 3/14/25 0111)       NEW PRESCRIPTIONS STARTED AT TODAY'S ER VISIT:  Discharge Medication List as of 3/14/2025  2:49 AM             =================================================================    HPI    Patient information was obtained from: patient    Use of : N/A       Damian Nava is a 34 year old male with a past medical history of NA, who presents for abdominal pain.  Patient reports 4 to 5 days of right lower abdominal to inguinal pain rating to the right  testicle.  He reports it comes and goes and he is not sure if he might of injured the testicle though it is not tender to palpation nor is it swollen or abnormal in appearance.  Patient denies any urinary discharge but does report some pressure feeling in the lower abdomen above the pubic bone when urinating.  He denies any dark urine or blood in the urine, no fevers or chills, no nausea or vomiting, no recent trauma of the testicle.  He also reports no tenderness on palpation of the testicle.    Per chart review [facility] 03/13/2025 abdominal pressure. Patient with mild pressure in lower abdomen over past 10 days reported as dull ache. Patient got new job and is more sedentary. Urinalysis without evidence of infection.       REVIEW OF SYSTEMS   Review of Systems     PAST MEDICAL HISTORY:  No past medical history on file.    PAST SURGICAL HISTORY:  Past Surgical History:   Procedure Laterality Date    BIOPSY  2006    Had a mole on my back growing up my mom wanted removed       CURRENT MEDICATIONS:    No current facility-administered medications for this encounter.    Current Outpatient Medications:     fish oil-omega-3 fatty acids 1000 MG capsule, , Disp: , Rfl:     Multiple Vitamin (MULTI-VITAMINS) TABS, Take 1 tablet by mouth daily, Disp: , Rfl:     ALLERGIES:  No Known Allergies    FAMILY HISTORY:  Family History   Problem Relation Age of Onset    Substance Abuse Father     Substance Abuse Brother     Substance Abuse Brother        SOCIAL HISTORY:   Social History     Socioeconomic History    Marital status:    Tobacco Use    Smoking status: Never    Smokeless tobacco: Never    Tobacco comments:     Never inhaled a thing in my life and proud of that   Substance and Sexual Activity    Alcohol use: Yes     Comment: 1-2 nights a week with 1-2 drinks a night    Drug use: Never    Sexual activity: Yes     Partners: Female     Birth control/protection: Condom, None   Other Topics  Concern    Parent/sibling w/ CABG, MI or angioplasty before 65F 55M? No     Social Drivers of Health     Financial Resource Strain: Low Risk  (3/12/2025)    Financial Resource Strain     Within the past 12 months, have you or your family members you live with been unable to get utilities (heat, electricity) when it was really needed?: No   Food Insecurity: Low Risk  (3/12/2025)    Food Insecurity     Within the past 12 months, did you worry that your food would run out before you got money to buy more?: No     Within the past 12 months, did the food you bought just not last and you didn t have money to get more?: No   Transportation Needs: Low Risk  (3/12/2025)    Transportation Needs     Within the past 12 months, has lack of transportation kept you from medical appointments, getting your medicines, non-medical meetings or appointments, work, or from getting things that you need?: No   Interpersonal Safety: Low Risk  (3/13/2025)    Interpersonal Safety     Do you feel physically and emotionally safe where you currently live?: Yes     Within the past 12 months, have you been hit, slapped, kicked or otherwise physically hurt by someone?: No     Within the past 12 months, have you been humiliated or emotionally abused in other ways by your partner or ex-partner?: No   Housing Stability: Low Risk  (3/12/2025)    Housing Stability     Do you have housing? : Yes     Are you worried about losing your housing?: No       PHYSICAL EXAM:    Vitals: BP (!) 149/95   Pulse 75   Temp 98.1  F (36.7  C) (Oral)   Resp 16   Ht 1.829 m (6')   Wt 108.9 kg (240 lb)   SpO2 98%   BMI 32.55 kg/m     Physical Exam  Vitals and nursing note reviewed.   Constitutional:       General: He is not in acute distress.     Appearance: He is well-developed and normal weight. He is not ill-appearing or toxic-appearing.   HENT:      Head: Normocephalic.   Eyes:      General: No scleral icterus.  Cardiovascular:      Rate and Rhythm: Normal rate  and regular rhythm.      Heart sounds: Normal heart sounds.   Pulmonary:      Effort: Pulmonary effort is normal. No respiratory distress.      Breath sounds: Normal breath sounds.   Abdominal:      General: Abdomen is flat.      Palpations: Abdomen is soft.      Tenderness: There is no abdominal tenderness. There is no right CVA tenderness, left CVA tenderness, guarding or rebound.      Hernia: No hernia is present.   Genitourinary:     Penis: Normal and circumcised.       Testes: Normal.         Right: Tenderness or swelling not present.         Left: Tenderness or swelling not present.      Epididymis:      Right: No tenderness.      Left: No tenderness.   Lymphadenopathy:      Lower Body: Right inguinal adenopathy present. No left inguinal adenopathy.   Skin:     General: Skin is warm and dry.      Capillary Refill: Capillary refill takes less than 2 seconds.      Coloration: Skin is not cyanotic or pale.      Findings: No erythema or rash.   Neurological:      General: No focal deficit present.      Mental Status: He is alert.   Psychiatric:         Behavior: Behavior normal.           LAB:  All pertinent labs reviewed and interpreted.  Labs Ordered and Resulted from Time of ED Arrival to Time of ED Departure   COMPREHENSIVE METABOLIC PANEL - Abnormal       Result Value    Sodium 138      Potassium 3.8      Carbon Dioxide (CO2) 23      Anion Gap 11      Urea Nitrogen 26.3 (*)     Creatinine 1.11      GFR Estimate 88      Calcium 9.5      Chloride 104      Glucose 110 (*)     Alkaline Phosphatase 78      AST 27      ALT 33      Protein Total 7.0      Albumin 4.5      Bilirubin Total 0.5     ROUTINE UA WITH MICROSCOPIC REFLEX TO CULTURE - Abnormal    Color Urine Light Yellow      Appearance Urine Clear      Glucose Urine Negative      Bilirubin Urine Negative      Ketones Urine Trace (*)     Specific Gravity Urine 1.021      Blood Urine Negative      pH Urine 5.5      Protein Albumin Urine Negative       Urobilinogen Urine <2.0      Nitrite Urine Negative      Leukocyte Esterase Urine Negative      Bacteria Urine None Seen      RBC Urine <1      WBC Urine 1      Squamous Epithelials Urine <1     LIPASE - Normal    Lipase 19     CBC WITH PLATELETS AND DIFFERENTIAL    WBC Count 6.3      RBC Count 4.90      Hemoglobin 15.0      Hematocrit 42.2      MCV 86      MCH 30.6      MCHC 35.5      RDW 12.0      Platelet Count 248      % Neutrophils 51      % Lymphocytes 36      % Monocytes 10      % Eosinophils 3      % Basophils 1      % Immature Granulocytes 0      NRBCs per 100 WBC 0      Absolute Neutrophils 3.2      Absolute Lymphocytes 2.3      Absolute Monocytes 0.6      Absolute Eosinophils 0.2      Absolute Basophils 0.0      Absolute Immature Granulocytes 0.0      Absolute NRBCs 0.0     CHLAMYDIA TRACHOMATIS PCR   NEISSERIA GONORRHOEAE PCR   URINE CULTURE       RADIOLOGY:  US Testicular & Scrotum w Doppler Ltd   Final Result   IMPRESSION:   1.  Normal ultrasound of the scrotum.      CT Abdomen Pelvis w/o Contrast   Final Result   IMPRESSION:    1.  No focal inflammatory change identified in the abdomen or pelvis. Specifically, the appendix is normal.           PROCEDURES:   Procedures       I, Otis Steinberg, am serving as a scribe to document services personally performed by Dr. Roby Guerrero  based on my observation and the provider's statements to me. I, Roby Guerrero MD attest that Otis Steinberg is acting in a scribe capacity, has observed my performance of the services and has documented them in accordance with my direction.      Roby Guerrero M.D.  Emergency Medicine  [facility]       Roby Guerrero MD  03/14/25 8514

## 2025-03-14 NOTE — ED TRIAGE NOTES
Pt has had lower abdominal pain for the last week.  Pt went to [facility] today and had a urinalysis which was negative.  Pt states that he took 500 mg of tylenol around 2330.       Triage Assessment (Adult)       Row Name 03/14/25 0028          Triage Assessment    Airway WDL WDL        Respiratory WDL    Respiratory WDL WDL        Skin Circulation/Temperature WDL    Skin Circulation/Temperature WDL WDL        Peripheral/Neurovascular WDL    Peripheral Neurovascular WDL WDL        Cognitive/Neuro/Behavioral WDL    Cognitive/Neuro/Behavioral WDL WDL

## 2025-03-15 LAB — BACTERIA UR CULT: NO GROWTH

## 2025-05-22 ENCOUNTER — E-VISIT (OUTPATIENT)
Dept: URGENT CARE | Facility: CLINIC | Age: 35
End: 2025-05-22
Payer: COMMERCIAL

## 2025-05-22 DIAGNOSIS — B96.89 ACUTE BACTERIAL SINUSITIS: Primary | ICD-10-CM

## 2025-05-22 DIAGNOSIS — J01.90 ACUTE BACTERIAL SINUSITIS: Primary | ICD-10-CM

## 2025-05-22 NOTE — PATIENT INSTRUCTIONS
Naltrexone/Bupropion (By mouth)   Bupropion Hydrochloride (zhc-VOUC-czi-on alexandria-droe-KLOR-isaias), Naltrexone Hydrochloride (nal-TREX-one alexandria-droe-KLOR-isaias)  Thank you for choosing us for your care. I have placed an order for a prescription so that you can start treatment:  Orders Placed This Encounter   Medications     amoxicillin-clavulanate (AUGMENTIN) 875-125 MG tablet     Sig: Take 1 tablet by mouth 2 times daily for 7 days.     Dispense:  14 tablet     Refill:  0        View your full visit summary for details by clicking on the link below. Your pharmacist will able to address any questions you may have about the medication.     If you're not feeling better within 5-7 days, please schedule an appointment.  You can schedule an appointment right here in [facility], or call [phone]  If the visit is for the same symptoms as your eVisit, we'll refund the cost of your eVisit if seen within seven days.     [phone]    © Copyright [address] 

## 2025-07-12 ENCOUNTER — HEALTH MAINTENANCE LETTER (OUTPATIENT)
Age: 35
End: 2025-07-12